# Patient Record
Sex: MALE | Race: OTHER | NOT HISPANIC OR LATINO
[De-identification: names, ages, dates, MRNs, and addresses within clinical notes are randomized per-mention and may not be internally consistent; named-entity substitution may affect disease eponyms.]

---

## 2021-10-29 PROBLEM — Z00.00 ENCOUNTER FOR PREVENTIVE HEALTH EXAMINATION: Status: ACTIVE | Noted: 2021-10-29

## 2021-11-05 ENCOUNTER — NON-APPOINTMENT (OUTPATIENT)
Age: 31
End: 2021-11-05

## 2021-11-05 ENCOUNTER — APPOINTMENT (OUTPATIENT)
Dept: UROLOGY | Facility: CLINIC | Age: 31
End: 2021-11-05
Payer: COMMERCIAL

## 2021-11-05 VITALS
HEIGHT: 69 IN | HEART RATE: 86 BPM | BODY MASS INDEX: 22.22 KG/M2 | TEMPERATURE: 97 F | SYSTOLIC BLOOD PRESSURE: 145 MMHG | WEIGHT: 150 LBS | DIASTOLIC BLOOD PRESSURE: 91 MMHG

## 2021-11-05 PROCEDURE — 99204 OFFICE O/P NEW MOD 45 MIN: CPT

## 2021-11-05 NOTE — PHYSICAL EXAM
[Urethral Meatus] : meatus normal [Penis Abnormality] : normal circumcised penis [Urinary Bladder Findings] : the bladder was normal on palpation [Scrotum] : the scrotum was normal [FreeTextEntry1] : 10cc softer testes. difficult exam. +hydroceles. High tight scrotum mildly hypoplastic. normal phallus. well virilized.

## 2021-11-05 NOTE — ASSESSMENT
[FreeTextEntry1] : hypogonadism\par hypopituitarianism\par hx chemo in childhood\par azoospermia\par on testosterone 3 packets daily\par reviwed with patient \par options - dc TRT start HCG vs cont TRT and add HCG and then downtitrate TRT\par role FSH reviewed\par will renew TRT\par start HCG 3000 IU 3/week\par TT FSH LH E2 CBC CMP today\par repeat labs 4 weeks\par decrease to 2 packets afterward\par f/u 6 weeks

## 2021-11-08 LAB
ALBUMIN SERPL ELPH-MCNC: 5.1 G/DL
ALP BLD-CCNC: 97 U/L
ALT SERPL-CCNC: 63 U/L
ANION GAP SERPL CALC-SCNC: 17 MMOL/L
AST SERPL-CCNC: 28 U/L
BASOPHILS # BLD AUTO: 0.04 K/UL
BASOPHILS NFR BLD AUTO: 0.7 %
BILIRUB SERPL-MCNC: 0.6 MG/DL
BUN SERPL-MCNC: 32 MG/DL
CALCIUM SERPL-MCNC: 9.9 MG/DL
CHLORIDE SERPL-SCNC: 98 MMOL/L
CO2 SERPL-SCNC: 25 MMOL/L
CREAT SERPL-MCNC: 1.37 MG/DL
EOSINOPHIL # BLD AUTO: 0.09 K/UL
EOSINOPHIL NFR BLD AUTO: 1.5 %
ESTRADIOL SERPL-MCNC: 14 PG/ML
FSH SERPL-MCNC: 3.1 IU/L
GLUCOSE SERPL-MCNC: 99 MG/DL
HCT VFR BLD CALC: 46.5 %
HGB BLD-MCNC: 16 G/DL
IMM GRANULOCYTES NFR BLD AUTO: 0.3 %
LH SERPL-ACNC: 1.6 IU/L
LYMPHOCYTES # BLD AUTO: 1.69 K/UL
LYMPHOCYTES NFR BLD AUTO: 29 %
MAN DIFF?: NORMAL
MCHC RBC-ENTMCNC: 30.2 PG
MCHC RBC-ENTMCNC: 34.4 GM/DL
MCV RBC AUTO: 87.7 FL
MONOCYTES # BLD AUTO: 0.57 K/UL
MONOCYTES NFR BLD AUTO: 9.8 %
NEUTROPHILS # BLD AUTO: 3.41 K/UL
NEUTROPHILS NFR BLD AUTO: 58.7 %
PLATELET # BLD AUTO: 327 K/UL
POTASSIUM SERPL-SCNC: 4 MMOL/L
PROT SERPL-MCNC: 7.9 G/DL
RBC # BLD: 5.3 M/UL
RBC # FLD: 13 %
SODIUM SERPL-SCNC: 141 MMOL/L
WBC # FLD AUTO: 5.82 K/UL

## 2021-11-08 RX ORDER — TESTOSTERONE 50 MG/5G
50 MG/5GM GEL TRANSDERMAL
Qty: 300 | Refills: 0 | Status: ACTIVE | COMMUNITY
Start: 2021-11-05 | End: 1900-01-01

## 2021-12-28 ENCOUNTER — APPOINTMENT (OUTPATIENT)
Dept: UROLOGY | Facility: CLINIC | Age: 31
End: 2021-12-28

## 2022-01-18 LAB
TESTOST BND SERPL-MCNC: 5.4 PG/ML
TESTOSTERONE TOTAL S: 135 NG/DL

## 2022-04-29 ENCOUNTER — APPOINTMENT (OUTPATIENT)
Dept: UROLOGY | Facility: CLINIC | Age: 32
End: 2022-04-29

## 2022-06-08 ENCOUNTER — RX RENEWAL (OUTPATIENT)
Age: 32
End: 2022-06-08

## 2022-06-10 ENCOUNTER — NON-APPOINTMENT (OUTPATIENT)
Age: 32
End: 2022-06-10

## 2022-06-10 LAB
ALBUMIN SERPL ELPH-MCNC: 4.8 G/DL
ALP BLD-CCNC: 103 U/L
ALT SERPL-CCNC: 28 U/L
ANION GAP SERPL CALC-SCNC: 21 MMOL/L
AST SERPL-CCNC: 20 U/L
BASOPHILS # BLD AUTO: 0.03 K/UL
BASOPHILS NFR BLD AUTO: 0.4 %
BILIRUB SERPL-MCNC: 0.3 MG/DL
BUN SERPL-MCNC: 19 MG/DL
CALCIUM SERPL-MCNC: 9.5 MG/DL
CHLORIDE SERPL-SCNC: 93 MMOL/L
CO2 SERPL-SCNC: 25 MMOL/L
CREAT SERPL-MCNC: 1.34 MG/DL
EGFR: 73 ML/MIN/1.73M2
EOSINOPHIL # BLD AUTO: 0.11 K/UL
EOSINOPHIL NFR BLD AUTO: 1.3 %
ESTRADIOL SERPL-MCNC: 65 PG/ML
FSH SERPL-MCNC: <0.3 IU/L
GLUCOSE SERPL-MCNC: 48 MG/DL
HCT VFR BLD CALC: 46.6 %
HGB BLD-MCNC: 16.1 G/DL
IMM GRANULOCYTES NFR BLD AUTO: 0.2 %
LH SERPL-ACNC: <0.3 IU/L
LYMPHOCYTES # BLD AUTO: 1.87 K/UL
LYMPHOCYTES NFR BLD AUTO: 22.6 %
MAN DIFF?: NORMAL
MCHC RBC-ENTMCNC: 29 PG
MCHC RBC-ENTMCNC: 34.5 GM/DL
MCV RBC AUTO: 84 FL
MONOCYTES # BLD AUTO: 0.74 K/UL
MONOCYTES NFR BLD AUTO: 8.9 %
NEUTROPHILS # BLD AUTO: 5.51 K/UL
NEUTROPHILS NFR BLD AUTO: 66.6 %
PLATELET # BLD AUTO: 327 K/UL
POTASSIUM SERPL-SCNC: 2.8 MMOL/L
PROT SERPL-MCNC: 7.4 G/DL
RBC # BLD: 5.55 M/UL
RBC # FLD: 13.6 %
SODIUM SERPL-SCNC: 139 MMOL/L
TESTOST SERPL-MCNC: 824 NG/DL
WBC # FLD AUTO: 8.28 K/UL

## 2022-06-16 ENCOUNTER — APPOINTMENT (OUTPATIENT)
Dept: UROLOGY | Facility: CLINIC | Age: 32
End: 2022-06-16
Payer: COMMERCIAL

## 2022-06-16 VITALS — SYSTOLIC BLOOD PRESSURE: 131 MMHG | TEMPERATURE: 98.5 F | HEART RATE: 93 BPM | DIASTOLIC BLOOD PRESSURE: 90 MMHG

## 2022-06-16 PROCEDURE — 99214 OFFICE O/P EST MOD 30 MIN: CPT

## 2022-06-16 RX ORDER — CHORIONIC GONADOTROPIN 10000 UNIT
10000 KIT INTRAMUSCULAR
Qty: 1 | Refills: 0 | Status: DISCONTINUED | COMMUNITY
Start: 2021-11-05 | End: 2022-06-16

## 2022-06-16 RX ORDER — NEEDLES, DISPOSABLE 25GX5/8"
21G X 1-1/2" NEEDLE, DISPOSABLE MISCELLANEOUS
Qty: 15 | Refills: 3 | Status: DISCONTINUED | COMMUNITY
Start: 2021-11-05 | End: 2022-06-16

## 2022-06-19 NOTE — ASSESSMENT
[FreeTextEntry1] : Hypogonadism\par Hypopituitarism\par s/p chemo in childhood\par Continue Testim, decrease to 2 packets daily \par Ovidrel 250 mcg weekly \par  - Kellen 10, 2022\par HCt 46.6%\par previous Azoospermia 2021\par Repeat SA \par will move towards dsiconitnuing TRT\par f/u in 3 months

## 2022-06-19 NOTE — HISTORY OF PRESENT ILLNESS
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 using 3 packets of testim daily\par Started Ovidrel February 2022, 250 mcg weekly \par  - Kellen 10, 2022\par HCt 46.6%\par K - 2.8, corrected by PCP \par Feels good energy \par

## 2022-09-27 ENCOUNTER — RX RENEWAL (OUTPATIENT)
Age: 32
End: 2022-09-27

## 2022-09-28 ENCOUNTER — RX RENEWAL (OUTPATIENT)
Age: 32
End: 2022-09-28

## 2022-10-24 ENCOUNTER — LABORATORY RESULT (OUTPATIENT)
Age: 32
End: 2022-10-24

## 2022-10-24 ENCOUNTER — APPOINTMENT (OUTPATIENT)
Dept: FAMILY MEDICINE | Facility: CLINIC | Age: 32
End: 2022-10-24

## 2022-10-24 VITALS
RESPIRATION RATE: 15 BRPM | TEMPERATURE: 98 F | HEART RATE: 110 BPM | WEIGHT: 143 LBS | HEIGHT: 69 IN | OXYGEN SATURATION: 98 % | BODY MASS INDEX: 21.18 KG/M2 | DIASTOLIC BLOOD PRESSURE: 74 MMHG | SYSTOLIC BLOOD PRESSURE: 125 MMHG

## 2022-10-24 DIAGNOSIS — Z86.39 PERSONAL HISTORY OF OTHER ENDOCRINE, NUTRITIONAL AND METABOLIC DISEASE: ICD-10-CM

## 2022-10-24 DIAGNOSIS — R50.9 FEVER, UNSPECIFIED: ICD-10-CM

## 2022-10-24 DIAGNOSIS — R53.83 OTHER FATIGUE: ICD-10-CM

## 2022-10-24 DIAGNOSIS — F17.290 NICOTINE DEPENDENCE, OTHER TOBACCO PRODUCT, UNCOMPLICATED: ICD-10-CM

## 2022-10-24 DIAGNOSIS — Z82.69 FAMILY HISTORY OF OTHER DISEASES OF THE MUSCULOSKELETAL SYSTEM AND CONNECTIVE TISSUE: ICD-10-CM

## 2022-10-24 DIAGNOSIS — Z78.9 OTHER SPECIFIED HEALTH STATUS: ICD-10-CM

## 2022-10-24 DIAGNOSIS — Z87.19 PERSONAL HISTORY OF OTHER DISEASES OF THE DIGESTIVE SYSTEM: ICD-10-CM

## 2022-10-24 DIAGNOSIS — Z85.841 PERSONAL HISTORY OF MALIGNANT NEOPLASM OF BRAIN: ICD-10-CM

## 2022-10-24 PROCEDURE — 99204 OFFICE O/P NEW MOD 45 MIN: CPT | Mod: 25

## 2022-10-24 PROCEDURE — 36415 COLL VENOUS BLD VENIPUNCTURE: CPT

## 2022-10-24 RX ORDER — SILDENAFIL 20 MG/1
20 TABLET ORAL
Qty: 30 | Refills: 0 | Status: ACTIVE | COMMUNITY
Start: 2022-04-04

## 2022-10-24 NOTE — ASSESSMENT
[FreeTextEntry1] : 32-year-old male with history of GERD and medulloblastoma s/p chemo/radiation treatment and brain resection (2004) and hypopituitarism presents to establish care.

## 2022-10-24 NOTE — PHYSICAL EXAM
[No Acute Distress] : no acute distress [Well Developed] : well developed [Well-Appearing] : well-appearing [Normal Sclera/Conjunctiva] : normal sclera/conjunctiva [EOMI] : extraocular movements intact [Normal Oropharynx] : the oropharynx was normal [Normal TMs] : both tympanic membranes were normal [No Lymphadenopathy] : no lymphadenopathy [Supple] : supple [No Respiratory Distress] : no respiratory distress  [No Accessory Muscle Use] : no accessory muscle use [Clear to Auscultation] : lungs were clear to auscultation bilaterally [Normal Rate] : normal rate  [Regular Rhythm] : with a regular rhythm [Normal S1, S2] : normal S1 and S2 [No Edema] : there was no peripheral edema [Soft] : abdomen soft [Non Tender] : non-tender [Non-distended] : non-distended [No Masses] : no abdominal mass palpated [Normal Bowel Sounds] : normal bowel sounds [No CVA Tenderness] : no CVA  tenderness [No Spinal Tenderness] : no spinal tenderness [Grossly Normal Strength/Tone] : grossly normal strength/tone [No Rash] : no rash [No Focal Deficits] : no focal deficits [Speech Grossly Normal] : speech grossly normal [Normal Affect] : the affect was normal [Normal Mood] : the mood was normal [de-identified] : firm, hard nodule on right ear (outer helix)

## 2022-10-24 NOTE — HISTORY OF PRESENT ILLNESS
[FreeTextEntry8] : 32-year-old male with history of GERD and medulloblastoma s/p chemo/radiation treatment and brain resection (2004) and hypopituitarism presents to establish care.\par Patient reports hiking in the [place] 2 days ago and since returning had 101 F yesterday, chills, myalgia and loss of appetite. Pt reports symptoms improved with taking Tylenol.\par He reports recent worsening of GERD symptoms.\par He denies cough, sore throat, chest pain, SOB, vomiting, abdominal pain, diarrhea, dysuria, urinary frequency or urgency.\par \par Patient also reports intermittent right great toe pain that last occurred about a month ago after drinking alcohol.\par \par Specialists:\par Endocrinologist at [facility]\par Urologist Dr. Lee \par

## 2022-10-24 NOTE — REVIEW OF SYSTEMS
[Fever] : fever [Chills] : chills [Fatigue] : fatigue [Heartburn] : heartburn [Joint Pain] : joint pain [Muscle Pain] : muscle pain [Negative] : Heme/Lymph [Hot Flashes] : no hot flashes [Night Sweats] : no night sweats [Recent Change In Weight] : ~T no recent weight change [Abdominal Pain] : no abdominal pain [Nausea] : no nausea [Constipation] : no constipation [Diarrhea] : no diarrhea [Vomiting] : no vomiting [Melena] : no melena [Joint Stiffness] : no joint stiffness [Muscle Weakness] : no muscle weakness [Back Pain] : no back pain [Joint Swelling] : no joint swelling

## 2022-10-24 NOTE — PLAN
[FreeTextEntry1] : \par \par #Fatigue\par #Fever and chills\par -Likely 2/2 Viral infection\par -Supportive care- rest, fluids\par -Tylenol prn pain/fever\par -COVID19 and RVP swab \par -CMP, CBC, Lipid, A1C, TSH w. rFT4, vitamin D level ordered \par -UA + urine culture\par \par #Right great toe pain\par -Uric acid\par \par #Hx of hypothyroidism\par -TSH w. rFT4 \par \par #Hx of hypopituitarism\par -Management as per Endocrinology\par \par #Hypogonadism\par -Management as per Urology \par \par Blood for labs drawn in office today\par \par Patient to contact office if symptoms worsen

## 2022-10-25 LAB
25(OH)D3 SERPL-MCNC: 25.1 NG/ML
ALBUMIN SERPL ELPH-MCNC: 4.7 G/DL
ALP BLD-CCNC: 106 U/L
ALT SERPL-CCNC: 40 U/L
ANION GAP SERPL CALC-SCNC: 14 MMOL/L
APPEARANCE: CLEAR
AST SERPL-CCNC: 51 U/L
BACTERIA: NEGATIVE
BASOPHILS # BLD AUTO: 0.03 K/UL
BASOPHILS NFR BLD AUTO: 0.4 %
BILIRUB SERPL-MCNC: 0.5 MG/DL
BILIRUBIN URINE: NEGATIVE
BLOOD URINE: ABNORMAL
BUN SERPL-MCNC: 17 MG/DL
CALCIUM SERPL-MCNC: 9 MG/DL
CHLORIDE SERPL-SCNC: 96 MMOL/L
CHOLEST SERPL-MCNC: 199 MG/DL
CO2 SERPL-SCNC: 26 MMOL/L
COLOR: YELLOW
CREAT SERPL-MCNC: 1.29 MG/DL
EGFR: 76 ML/MIN/1.73M2
EOSINOPHIL # BLD AUTO: 0.07 K/UL
EOSINOPHIL NFR BLD AUTO: 0.9 %
ESTIMATED AVERAGE GLUCOSE: 117 MG/DL
ESTRADIOL SERPL-MCNC: 44 PG/ML
GLUCOSE QUALITATIVE U: NEGATIVE
GLUCOSE SERPL-MCNC: 87 MG/DL
HBA1C MFR BLD HPLC: 5.7 %
HCT VFR BLD CALC: 43.9 %
HDLC SERPL-MCNC: 41 MG/DL
HGB BLD-MCNC: 15.1 G/DL
HYALINE CASTS: 1 /LPF
IMM GRANULOCYTES NFR BLD AUTO: 0.5 %
KETONES URINE: NEGATIVE
LDLC SERPL CALC-MCNC: 141 MG/DL
LEUKOCYTE ESTERASE URINE: NEGATIVE
LH SERPL-ACNC: 0.3 IU/L
LYMPHOCYTES # BLD AUTO: 1.42 K/UL
LYMPHOCYTES NFR BLD AUTO: 19 %
MAN DIFF?: NORMAL
MCHC RBC-ENTMCNC: 29.3 PG
MCHC RBC-ENTMCNC: 34.4 GM/DL
MCV RBC AUTO: 85.1 FL
MICROSCOPIC-UA: NORMAL
MONOCYTES # BLD AUTO: 0.83 K/UL
MONOCYTES NFR BLD AUTO: 11.1 %
NEUTROPHILS # BLD AUTO: 5.08 K/UL
NEUTROPHILS NFR BLD AUTO: 68.1 %
NITRITE URINE: NEGATIVE
NONHDLC SERPL-MCNC: 158 MG/DL
PH URINE: 6
PLATELET # BLD AUTO: 370 K/UL
POTASSIUM SERPL-SCNC: 3.8 MMOL/L
PROT SERPL-MCNC: 7.7 G/DL
PROTEIN URINE: ABNORMAL
RAPID RVP RESULT: NOT DETECTED
RBC # BLD: 5.16 M/UL
RBC # FLD: 13.3 %
RED BLOOD CELLS URINE: 2 /HPF
SARS-COV-2 RNA PNL RESP NAA+PROBE: NOT DETECTED
SODIUM SERPL-SCNC: 137 MMOL/L
SPECIFIC GRAVITY URINE: 1.03
SQUAMOUS EPITHELIAL CELLS: 0 /HPF
TESTOST SERPL-MCNC: 385 NG/DL
TRIGL SERPL-MCNC: 86 MG/DL
TSH SERPL-ACNC: <0.01 UIU/ML
URATE SERPL-MCNC: 9.1 MG/DL
UROBILINOGEN URINE: NORMAL
VIT B12 SERPL-MCNC: 474 PG/ML
WBC # FLD AUTO: 7.47 K/UL
WHITE BLOOD CELLS URINE: 1 /HPF

## 2022-10-26 LAB — BACTERIA UR CULT: NORMAL

## 2022-10-28 ENCOUNTER — APPOINTMENT (OUTPATIENT)
Dept: OTOLARYNGOLOGY | Facility: CLINIC | Age: 32
End: 2022-10-28

## 2022-11-02 ENCOUNTER — OUTPATIENT (OUTPATIENT)
Dept: OUTPATIENT SERVICES | Facility: HOSPITAL | Age: 32
LOS: 1 days | End: 2022-11-02

## 2022-11-02 ENCOUNTER — APPOINTMENT (OUTPATIENT)
Dept: MRI IMAGING | Facility: CLINIC | Age: 32
End: 2022-11-02

## 2022-11-02 ENCOUNTER — RX CHANGE (OUTPATIENT)
Age: 32
End: 2022-11-02

## 2022-11-02 ENCOUNTER — TRANSCRIPTION ENCOUNTER (OUTPATIENT)
Age: 32
End: 2022-11-02

## 2022-11-02 LAB — FSH SERPL-MCNC: <0.3 IU/L

## 2022-11-02 PROCEDURE — 70553 MRI BRAIN STEM W/O & W/DYE: CPT | Mod: 26

## 2022-11-11 ENCOUNTER — APPOINTMENT (OUTPATIENT)
Dept: UROLOGY | Facility: CLINIC | Age: 32
End: 2022-11-11

## 2023-01-23 ENCOUNTER — APPOINTMENT (OUTPATIENT)
Dept: ENDOCRINOLOGY | Facility: CLINIC | Age: 33
End: 2023-01-23
Payer: COMMERCIAL

## 2023-01-23 VITALS
DIASTOLIC BLOOD PRESSURE: 86 MMHG | BODY MASS INDEX: 22.3 KG/M2 | WEIGHT: 151 LBS | HEART RATE: 106 BPM | SYSTOLIC BLOOD PRESSURE: 126 MMHG

## 2023-01-23 PROCEDURE — 99205 OFFICE O/P NEW HI 60 MIN: CPT

## 2023-01-23 NOTE — END OF VISIT
[FreeTextEntry3] : All medical record entries made by the Scribe were at my, Dr. Abran Mcdonald, direction and personally dictated by me on 01/23/2023. I have reviewed the chart and agree that the record accurately reflects my personal performance of the history, physical exam, assessment and plan. I have also personally directed, reviewed and agreed with the chart.  [Time Spent: ___ minutes] : I have spent [unfilled] minutes of time on the encounter.

## 2023-01-23 NOTE — HISTORY OF PRESENT ILLNESS
[FreeTextEntry1] : 32 year old M pt, with Hx of Hypothyroidism (dx. in 2004), presents today to establish endocrine care. \par # Hypothyroidism, secondary to brain resection in 2004. (Of note, developed hypogonadism and hypopituitarism shortly after resection):\par Treatment: Been on Synthroid since 2004. \par Denies history of: bone fractures. \par \par Other PMHx: medulloblastoma (dx age 13 s/p chemo/radiation treatment -received cisplatin- and brain resection in 2004 at [facility] complicated by hypopituitarism), Hypogonadism (on testosterone since 2017), GERD, flared gout. \par SHx: Pediatrician. \par Seeing urologist for fertility. \par \par 01/23/2023\par Review of pt's chart:\par - 11/05/21 Urologist note, Dr. Lee: Medulloblastoma s/p chemo/radiation treatment -received cisplatin- and brain resection in 2004 complicated by hypopituitarism. On Synthroid and testosterone treatment (Testin) since 2017. Pt started HCG 3000 IU tiw this day. \par - 06/16/22 Follow up note by Dr. Lee: Ovidrel 250 mcg qw with plan to discontinue testosterone. \par - 10/25/22 MED note: History of flared gout. \par \par Pt is here for hypothyroidism developed soon after treatment for brain tumor age 13, treated with craniospinal radiation and Cisplatin at [facility] in [place]. Developed hypothyroidism soon after treatment. Been on Synthroid since 6064-7895. \par Pt also was diagnosed with hypogonadism and hypopituitarism since surgery. Seen urologist for fertility (has plan for having kids). Currently on Ovidrel. Sperm testing is scheduled by urologist. Recent blood work completed.\par \par Today, pt is feeling well, with no physical complaints. \par Denies dizziness and lightheadedness. \par \par [Medications verified as per pt on 01/23/2023] \par Current Medications: Levothyroxine 125 mcg (rx 2004), testosterone supplementation (rx 2017), Ovidrel 250 mcg qw\par - Held: Growth hormone (Dced a "few years ago" noted 01/23/2023)

## 2023-01-23 NOTE — REASON FOR VISIT
[Initial Evaluation] : an initial evaluation [Hypothyroidism] : hypothyroidism [Pituitary Evaluation/ Disorder] : pituitary evaluation/disorder [Hypogonadism] : hypogonadism

## 2023-01-23 NOTE — ADDENDUM
[FreeTextEntry1] : I Shanikawinston Domínguez act soley as a scribe for Dr. Abran Mcdonald on this date. 01/23/2023

## 2023-01-23 NOTE — PHYSICAL EXAM
[Alert] : alert [Well Nourished] : well nourished [No Acute Distress] : no acute distress [Well Developed] : well developed [Normal Sclera/Conjunctiva] : normal sclera/conjunctiva [EOMI] : extra ocular movement intact [No Proptosis] : no proptosis [Normal Oropharynx] : the oropharynx was normal [Thyroid Not Enlarged] : the thyroid was not enlarged [No Thyroid Nodules] : no palpable thyroid nodules [No Respiratory Distress] : no respiratory distress [No Accessory Muscle Use] : no accessory muscle use [Clear to Auscultation] : lungs were clear to auscultation bilaterally [Normal S1, S2] : normal S1 and S2 [Normal Rate] : heart rate was normal [Regular Rhythm] : with a regular rhythm [No Edema] : no peripheral edema [Pedal Pulses Normal] : the pedal pulses are present [Normal Bowel Sounds] : normal bowel sounds [Not Tender] : non-tender [Not Distended] : not distended [Soft] : abdomen soft [No Spinal Tenderness] : no spinal tenderness [Spine Straight] : spine straight [No Stigmata of Cushings Syndrome] : no stigmata of Cushings Syndrome [Normal Gait] : normal gait [Normal Strength/Tone] : muscle strength and tone were normal [No Rash] : no rash [Normal Reflexes] : deep tendon reflexes were 2+ and symmetric [No Tremors] : no tremors [Oriented x3] : oriented to person, place, and time [Visual Fields Grossly Intact] : visual fields grossly intact [Acanthosis Nigricans] : no acanthosis nigricans [de-identified] : Confrontational visual fields grossly intact.

## 2023-02-07 ENCOUNTER — NON-APPOINTMENT (OUTPATIENT)
Age: 33
End: 2023-02-07

## 2023-02-08 LAB
ACTH-ESO: 28 PG/ML
ALBUMIN SERPL ELPH-MCNC: 4.2 G/DL
ALP BLD-CCNC: 94 U/L
ALT SERPL-CCNC: 33 U/L
ANION GAP SERPL CALC-SCNC: 15 MMOL/L
AST SERPL-CCNC: 22 U/L
BILIRUB SERPL-MCNC: 0.3 MG/DL
BUN SERPL-MCNC: 21 MG/DL
CALCIUM SERPL-MCNC: 9.5 MG/DL
CHLORIDE SERPL-SCNC: 100 MMOL/L
CO2 SERPL-SCNC: 24 MMOL/L
CORTIS SERPL-MCNC: 6.7 UG/DL
CORTIS SERPL-MCNC: 7.5 UG/DL
CREAT SERPL-MCNC: 1.24 MG/DL
EGFR: 79 ML/MIN/1.73M2
GLUCOSE SERPL-MCNC: 104 MG/DL
IGF-1 INTERP: NORMAL
IGF-I BLD-MCNC: 116 NG/ML
POTASSIUM SERPL-SCNC: 3.7 MMOL/L
PROLACTIN SERPL-MCNC: 12.8 NG/ML
PROT SERPL-MCNC: 6.7 G/DL
SHBG-ESOTERIX: 24.2 NMOL/L
SODIUM SERPL-SCNC: 139 MMOL/L
T3 SERPL-MCNC: 173 NG/DL
T4 FREE SERPL-MCNC: 2.1 NG/DL

## 2023-02-22 ENCOUNTER — APPOINTMENT (OUTPATIENT)
Dept: HUMAN REPRODUCTION | Facility: CLINIC | Age: 33
End: 2023-02-22
Payer: COMMERCIAL

## 2023-02-22 PROCEDURE — 89322 SEMEN ANAL STRICT CRITERIA: CPT

## 2023-04-10 ENCOUNTER — APPOINTMENT (OUTPATIENT)
Dept: ENDOCRINOLOGY | Facility: CLINIC | Age: 33
End: 2023-04-10

## 2023-05-22 ENCOUNTER — APPOINTMENT (OUTPATIENT)
Dept: ENDOCRINOLOGY | Facility: CLINIC | Age: 33
End: 2023-05-22
Payer: COMMERCIAL

## 2023-05-22 VITALS
BODY MASS INDEX: 21.92 KG/M2 | DIASTOLIC BLOOD PRESSURE: 77 MMHG | HEIGHT: 69 IN | WEIGHT: 148 LBS | SYSTOLIC BLOOD PRESSURE: 119 MMHG | HEART RATE: 89 BPM

## 2023-05-22 PROCEDURE — 99215 OFFICE O/P EST HI 40 MIN: CPT

## 2023-05-24 ENCOUNTER — APPOINTMENT (OUTPATIENT)
Dept: ENDOCRINOLOGY | Facility: CLINIC | Age: 33
End: 2023-05-24
Payer: COMMERCIAL

## 2023-05-24 VITALS
WEIGHT: 148 LBS | BODY MASS INDEX: 21.86 KG/M2 | HEART RATE: 77 BPM | SYSTOLIC BLOOD PRESSURE: 116 MMHG | DIASTOLIC BLOOD PRESSURE: 69 MMHG

## 2023-05-24 PROCEDURE — 96372 THER/PROPH/DIAG INJ SC/IM: CPT

## 2023-05-24 RX ORDER — COSYNTROPIN 0.25 MG/ML
0.25 INJECTION, POWDER, LYOPHILIZED, FOR SOLUTION INTRAVENOUS
Qty: 0 | Refills: 0 | Status: COMPLETED | OUTPATIENT
Start: 2023-05-24

## 2023-05-24 RX ADMIN — COSYNTROPIN MG: 0.25 INJECTION, POWDER, FOR SOLUTION INTRAMUSCULAR; INTRAVENOUS at 00:00

## 2023-05-25 ENCOUNTER — TRANSCRIPTION ENCOUNTER (OUTPATIENT)
Age: 33
End: 2023-05-25

## 2023-05-25 LAB
ACTH SER-ACNC: 23.4 PG/ML
ACTH STIM ACTH BASELINE: 25.6 PG/ML
ACTH STIM CORTISOL 30 MIN: 22.1 UG/DL
ACTH STIM CORTISOL BASELINE: 7.5 UG/DL
ACTH STIMULATION : CORTISOL 60 MIN: 24.8 UG/DL
ALBUMIN SERPL ELPH-MCNC: 4.7 G/DL
ALP BLD-CCNC: 93 U/L
ALT SERPL-CCNC: 60 U/L
ANION GAP SERPL CALC-SCNC: 15 MMOL/L
AST SERPL-CCNC: 32 U/L
BILIRUB SERPL-MCNC: 0.5 MG/DL
BUN SERPL-MCNC: 25 MG/DL
CALCIUM SERPL-MCNC: 9.2 MG/DL
CHLORIDE SERPL-SCNC: 98 MMOL/L
CO2 SERPL-SCNC: 26 MMOL/L
CORTIS SERPL-MCNC: 5.9 UG/DL
CREAT SERPL-MCNC: 1.39 MG/DL
EGFR: 69 ML/MIN/1.73M2
HCT VFR BLD CALC: 45.3 %
HGB BLD-MCNC: 15.5 G/DL
MCHC RBC-ENTMCNC: 29.3 PG
MCHC RBC-ENTMCNC: 34.2 GM/DL
MCV RBC AUTO: 85.6 FL
PLATELET # BLD AUTO: 310 K/UL
POTASSIUM SERPL-SCNC: 4.8 MMOL/L
PROT SERPL-MCNC: 7.4 G/DL
RBC # BLD: 5.29 M/UL
RBC # FLD: 14.6 %
SODIUM SERPL-SCNC: 140 MMOL/L
T4 FREE SERPL-MCNC: 1.5 NG/DL
TSH SERPL-ACNC: 0.04 UIU/ML
WBC # FLD AUTO: 6.49 K/UL

## 2023-05-25 NOTE — ASSESSMENT
[FreeTextEntry1] : 1. Hypopituitarism\par Patient of Dr Mcdonald presents for ACTH Stim testing\par Hospitalist at [facility]\par Recent AM cortisol 5.9\par Baseline labs obtained\par Following baseline labs, 0.25mg of Cosyntropin (NDC 5331-2330-58, LOT: [id number], EXP: 11/2023) administered IM to left deltoid by myself at 1004A.\par 30 minute cortisol to be obtained at 1034A\par 60 minute cortisol to be obtained at 1104A\par \par Will FU with Dr Mcdonald\par \par Paula Bowens, MS, FNP-BC, [facility][note 3]\par 05/24/2023\par \par

## 2023-05-25 NOTE — REVIEW OF SYSTEMS
[Negative] : Heme/Lymph [Chest Pain] : no chest pain [Nausea] : no nausea [Abdominal Pain] : no abdominal pain [Polyuria] : no polyuria [Muscle Weakness] : no muscle weakness [Muscle Cramps] : no muscle cramps [Headaches] : no headaches [Dizziness] : no dizziness [Fainting] : no fainting [Polydipsia] : no polydipsia [Easy Bruising] : no tendency for easy bruising

## 2023-05-25 NOTE — PHYSICAL EXAM
[Alert] : alert [Well Nourished] : well nourished [No Acute Distress] : no acute distress [Well Developed] : well developed [Normal Sclera/Conjunctiva] : normal sclera/conjunctiva [EOMI] : extra ocular movement intact [Visual Fields Grossly Intact] : visual fields grossly intact [No Proptosis] : no proptosis [Normal Oropharynx] : the oropharynx was normal [Thyroid Not Enlarged] : the thyroid was not enlarged [No Thyroid Nodules] : no palpable thyroid nodules [No Respiratory Distress] : no respiratory distress [No Accessory Muscle Use] : no accessory muscle use [Clear to Auscultation] : lungs were clear to auscultation bilaterally [Normal S1, S2] : normal S1 and S2 [Normal Rate] : heart rate was normal [Regular Rhythm] : with a regular rhythm [No Edema] : no peripheral edema [Pedal Pulses Normal] : the pedal pulses are present [Normal Bowel Sounds] : normal bowel sounds [Not Tender] : non-tender [Not Distended] : not distended [Soft] : abdomen soft [No Spinal Tenderness] : no spinal tenderness [Spine Straight] : spine straight [No Stigmata of Cushings Syndrome] : no stigmata of Cushings Syndrome [Normal Gait] : normal gait [Normal Strength/Tone] : muscle strength and tone were normal [No Rash] : no rash [Normal Reflexes] : deep tendon reflexes were 2+ and symmetric [No Tremors] : no tremors [Oriented x3] : oriented to person, place, and time [Acanthosis Nigricans] : no acanthosis nigricans [de-identified] : Confrontational visual fields grossly intact.

## 2023-05-25 NOTE — END OF VISIT
[Time Spent: ___ minutes] : I have spent [unfilled] minutes of time on the encounter. [FreeTextEntry3] : I, Ras Finnegan, acted solely as a scribe for Dr. Abran Mcdonald on this date 05/22/2023\par

## 2023-05-25 NOTE — ADDENDUM
[FreeTextEntry1] : 5/25/23, addendum, SG\par [facility] message sent with lab interpretation

## 2023-05-25 NOTE — ADDENDUM
[FreeTextEntry1] : 5/25/23, addendum, SG\par [facility] message sent with lab interpretation

## 2023-05-25 NOTE — ADDENDUM
[FreeTextEntry1] : All medical record entries made by the Scribe were at my, Dr. Abran Mcdonald, direction and personally dictated by me on 05/22/2023. I have reviewed the chart and agree that the record accurately reflects my personal performance of the history, physical exam, assessment and plan. I have also personally, directed, reviewed and agreed with the chart\par

## 2023-05-25 NOTE — ASSESSMENT
[FreeTextEntry1] : 1. Hypopituitarism\par Patient of Dr Mcdonald presents for ACTH Stim testing\par Hospitalist at [facility]\par Recent AM cortisol 5.9\par Baseline labs obtained\par Following baseline labs, 0.25mg of Cosyntropin (NDC 4174-4887-80, LOT: [id number], EXP: 11/2023) administered IM to left deltoid by myself at 1004A.\par 30 minute cortisol to be obtained at 1034A\par 60 minute cortisol to be obtained at 1104A\par \par Will FU with Dr Mcdonald\par \par Paula Bowens, MS, FNP-BC, [facility]\par 05/24/2023\par \par

## 2023-05-25 NOTE — REASON FOR VISIT
[Hypothyroidism] : hypothyroidism [Pituitary Evaluation/ Disorder] : pituitary evaluation/disorder [Hypogonadism] : hypogonadism [Follow - Up] : a follow-up visit [Other___] : [unfilled]

## 2023-05-25 NOTE — HISTORY OF PRESENT ILLNESS
[FreeTextEntry1] : 32 year old M pt, with Hx of Hypothyroidism (dx. in 2004), presents today to establish endocrine care. \par # Hypothyroidism, secondary to brain resection in 2004. (Of note, developed hypogonadism and hypopituitarism shortly after resection):\par Treatment: Been on Synthroid since 2004. \par Denies history of: bone fractures. \par \par Other PMHx: medulloblastoma (dx age 13 s/p chemo/radiation treatment -received cisplatin- and brain resection in 2004 at [facility] complicated by hypopituitarism), Hypogonadism (on testosterone since 2017), GERD, flared gout. \par SHx: Pediatrician. \par Seeing urologist for fertility. \par \par 01/23/2023\par Review of pt's chart:\par - 11/05/21 Urologist note, Dr. Lee: Medulloblastoma s/p chemo/radiation treatment -received cisplatin- and brain resection in 2004 complicated by hypopituitarism. On Synthroid and testosterone treatment (Testin) since 2017. Pt started HCG 3000 IU tiw this day. \par - 06/16/22 Follow up note by Dr. Lee: Ovidrel 250 mcg qw with plan to discontinue testosterone. \par - 10/25/22 MED note: History of flared gout. \par \par Pt is here for hypothyroidism developed soon after treatment for brain tumor age 13, treated with craniospinal radiation and Cisplatin at [facility] in [place]. Developed hypothyroidism soon after treatment. Been on Synthroid since 2793-7726. \par Pt also was diagnosed with hypogonadism and hypopituitarism since surgery. Seen urologist for fertility (has plan for having kids). Currently on Ovidrel. Sperm testing is scheduled by urologist. Recent blood work completed.\par \par Today, pt is feeling well, with no physical complaints. \par Denies dizziness and lightheadedness. \par \par 05/22/2023 \par CC: "I am doing well" , He is physically active, his level of energy is adequate, although his libido is decreased. patient reports missed HCG injection two weeks ago, serum am cortisol has been low twice.\par Patient denies lightheadedness, fainting episodes, weight lost.\par Last visit free T4 was increased.\par \par \par Current Medications: Levothyroxine 112 mcg , testosterone supplementation (rx 2017), Ovidrel 250 mcg qw\par - Held: Growth hormone (Dced a "few years ago" noted 01/23/2023)

## 2023-06-02 ENCOUNTER — APPOINTMENT (OUTPATIENT)
Dept: UROLOGY | Facility: CLINIC | Age: 33
End: 2023-06-02
Payer: COMMERCIAL

## 2023-06-02 VITALS — TEMPERATURE: 98 F | HEART RATE: 85 BPM | SYSTOLIC BLOOD PRESSURE: 126 MMHG | DIASTOLIC BLOOD PRESSURE: 78 MMHG

## 2023-06-02 DIAGNOSIS — M79.676 PAIN IN UNSPECIFIED TOE(S): ICD-10-CM

## 2023-06-02 PROCEDURE — 99214 OFFICE O/P EST MOD 30 MIN: CPT

## 2023-06-02 NOTE — ASSESSMENT
[FreeTextEntry1] : hypopituitarisim\par hypogoandism\par TT FSH LH E2 today\par CBC CMP\par consider increase to Ovidrel 250 2/weekly pending results\par reviewed role rFSH\par will hold for now - optimize hormones x few months and then reassess\par f/u 6 months\par

## 2023-06-07 LAB
ALBUMIN SERPL ELPH-MCNC: 4.7 G/DL
ALP BLD-CCNC: 97 U/L
ALT SERPL-CCNC: 51 U/L
ANION GAP SERPL CALC-SCNC: 13 MMOL/L
AST SERPL-CCNC: 27 U/L
BILIRUB SERPL-MCNC: 0.4 MG/DL
BUN SERPL-MCNC: 24 MG/DL
CALCIUM SERPL-MCNC: 9.6 MG/DL
CHLORIDE SERPL-SCNC: 99 MMOL/L
CO2 SERPL-SCNC: 27 MMOL/L
CREAT SERPL-MCNC: 1.36 MG/DL
EGFR: 71 ML/MIN/1.73M2
ESTRADIOL SERPL-MCNC: 23 PG/ML
FSH SERPL-MCNC: 0.4 IU/L
GLUCOSE SERPL-MCNC: 94 MG/DL
LH SERPL-ACNC: <0.3 IU/L
POTASSIUM SERPL-SCNC: 4.5 MMOL/L
PROT SERPL-MCNC: 7.1 G/DL
SODIUM SERPL-SCNC: 139 MMOL/L
TESTOST FREE SERPL-MCNC: 0.2 PG/ML
TESTOST SERPL-MCNC: 282 NG/DL

## 2023-06-08 ENCOUNTER — TRANSCRIPTION ENCOUNTER (OUTPATIENT)
Age: 33
End: 2023-06-08

## 2023-08-11 ENCOUNTER — APPOINTMENT (OUTPATIENT)
Dept: DERMATOLOGY | Facility: CLINIC | Age: 33
End: 2023-08-11
Payer: COMMERCIAL

## 2023-08-11 ENCOUNTER — LABORATORY RESULT (OUTPATIENT)
Age: 33
End: 2023-08-11

## 2023-08-11 DIAGNOSIS — L98.9 DISORDER OF THE SKIN AND SUBCUTANEOUS TISSUE, UNSPECIFIED: ICD-10-CM

## 2023-08-11 PROCEDURE — 99213 OFFICE O/P EST LOW 20 MIN: CPT

## 2023-08-11 PROCEDURE — 99203 OFFICE O/P NEW LOW 30 MIN: CPT

## 2023-08-14 ENCOUNTER — NON-APPOINTMENT (OUTPATIENT)
Age: 33
End: 2023-08-14

## 2023-08-16 ENCOUNTER — RX RENEWAL (OUTPATIENT)
Age: 33
End: 2023-08-16

## 2023-10-16 ENCOUNTER — TRANSCRIPTION ENCOUNTER (OUTPATIENT)
Age: 33
End: 2023-10-16

## 2023-10-16 ENCOUNTER — NON-APPOINTMENT (OUTPATIENT)
Age: 33
End: 2023-10-16

## 2023-10-18 ENCOUNTER — TRANSCRIPTION ENCOUNTER (OUTPATIENT)
Age: 33
End: 2023-10-18

## 2023-10-18 LAB
ALBUMIN SERPL ELPH-MCNC: 4.6 G/DL
ALP BLD-CCNC: 100 U/L
ALT SERPL-CCNC: 30 U/L
ANION GAP SERPL CALC-SCNC: 12 MMOL/L
AST SERPL-CCNC: 15 U/L
BASOPHILS # BLD AUTO: 0.04 K/UL
BASOPHILS NFR BLD AUTO: 0.6 %
BILIRUB SERPL-MCNC: 0.2 MG/DL
BUN SERPL-MCNC: 29 MG/DL
CALCIUM SERPL-MCNC: 9.4 MG/DL
CHLORIDE SERPL-SCNC: 100 MMOL/L
CO2 SERPL-SCNC: 26 MMOL/L
CREAT SERPL-MCNC: 1.4 MG/DL
EGFR: 68 ML/MIN/1.73M2
EOSINOPHIL # BLD AUTO: 0.15 K/UL
EOSINOPHIL NFR BLD AUTO: 2.4 %
ESTRADIOL SERPL-MCNC: 29 PG/ML
GLUCOSE SERPL-MCNC: 117 MG/DL
HCT VFR BLD CALC: 40.7 %
HGB BLD-MCNC: 13.8 G/DL
IMM GRANULOCYTES NFR BLD AUTO: 0.3 %
LYMPHOCYTES # BLD AUTO: 1.96 K/UL
LYMPHOCYTES NFR BLD AUTO: 30.9 %
MAN DIFF?: NORMAL
MCHC RBC-ENTMCNC: 29.5 PG
MCHC RBC-ENTMCNC: 33.9 GM/DL
MCV RBC AUTO: 87 FL
MONOCYTES # BLD AUTO: 0.62 K/UL
MONOCYTES NFR BLD AUTO: 9.8 %
NEUTROPHILS # BLD AUTO: 3.56 K/UL
NEUTROPHILS NFR BLD AUTO: 56 %
PLATELET # BLD AUTO: 302 K/UL
POTASSIUM SERPL-SCNC: 3.6 MMOL/L
PROT SERPL-MCNC: 6.9 G/DL
RBC # BLD: 4.68 M/UL
RBC # FLD: 13.4 %
SODIUM SERPL-SCNC: 138 MMOL/L
TESTOST FREE SERPL-MCNC: 0.7 PG/ML
TESTOST SERPL-MCNC: 237 NG/DL
WBC # FLD AUTO: 6.35 K/UL

## 2023-10-23 ENCOUNTER — TRANSCRIPTION ENCOUNTER (OUTPATIENT)
Age: 33
End: 2023-10-23

## 2023-11-07 ENCOUNTER — APPOINTMENT (OUTPATIENT)
Dept: ENDOCRINOLOGY | Facility: CLINIC | Age: 33
End: 2023-11-07
Payer: COMMERCIAL

## 2023-11-07 VITALS
HEIGHT: 69 IN | BODY MASS INDEX: 22.22 KG/M2 | HEART RATE: 92 BPM | DIASTOLIC BLOOD PRESSURE: 67 MMHG | SYSTOLIC BLOOD PRESSURE: 103 MMHG | WEIGHT: 150 LBS

## 2023-11-07 PROCEDURE — 99215 OFFICE O/P EST HI 40 MIN: CPT

## 2023-11-08 ENCOUNTER — APPOINTMENT (OUTPATIENT)
Dept: NEUROLOGY | Facility: CLINIC | Age: 33
End: 2023-11-08
Payer: COMMERCIAL

## 2023-11-08 ENCOUNTER — NON-APPOINTMENT (OUTPATIENT)
Age: 33
End: 2023-11-08

## 2023-11-08 VITALS
WEIGHT: 150 LBS | RESPIRATION RATE: 16 BRPM | OXYGEN SATURATION: 97 % | SYSTOLIC BLOOD PRESSURE: 113 MMHG | HEIGHT: 69 IN | DIASTOLIC BLOOD PRESSURE: 77 MMHG | HEART RATE: 93 BPM | BODY MASS INDEX: 22.22 KG/M2

## 2023-11-08 PROCEDURE — 99204 OFFICE O/P NEW MOD 45 MIN: CPT

## 2023-12-12 ENCOUNTER — APPOINTMENT (OUTPATIENT)
Dept: MRI IMAGING | Facility: CLINIC | Age: 33
End: 2023-12-12

## 2024-01-04 RX ORDER — CHORIOGONADOTROPIN ALFA 250 UG/.5ML
250 INJECTION, SOLUTION SUBCUTANEOUS
Qty: 6 | Refills: 1 | Status: ACTIVE | COMMUNITY
Start: 2022-01-14 | End: 1900-01-01

## 2024-01-07 ENCOUNTER — NON-APPOINTMENT (OUTPATIENT)
Age: 34
End: 2024-01-07

## 2024-01-08 ENCOUNTER — NON-APPOINTMENT (OUTPATIENT)
Age: 34
End: 2024-01-08

## 2024-01-08 ENCOUNTER — TRANSCRIPTION ENCOUNTER (OUTPATIENT)
Age: 34
End: 2024-01-08

## 2024-01-08 DIAGNOSIS — E29.1 TESTICULAR HYPOFUNCTION: ICD-10-CM

## 2024-01-08 RX ORDER — GONADOTROPHIN, CHORIONIC 10000 UNIT
10000 KIT INTRAMUSCULAR
Qty: 1 | Refills: 1 | Status: DISCONTINUED | COMMUNITY
Start: 2024-01-08 | End: 2024-01-08

## 2024-01-23 ENCOUNTER — APPOINTMENT (OUTPATIENT)
Dept: UROLOGY | Facility: CLINIC | Age: 34
End: 2024-01-23
Payer: COMMERCIAL

## 2024-01-23 VITALS
BODY MASS INDEX: 22.22 KG/M2 | HEART RATE: 72 BPM | SYSTOLIC BLOOD PRESSURE: 114 MMHG | HEIGHT: 69 IN | WEIGHT: 150 LBS | DIASTOLIC BLOOD PRESSURE: 65 MMHG | TEMPERATURE: 97.5 F

## 2024-01-23 DIAGNOSIS — E23.0 HYPOPITUITARISM: ICD-10-CM

## 2024-01-23 LAB
ESTRADIOL SERPL-MCNC: 45 PG/ML
FSH SERPL-MCNC: <0.3 IU/L
LH SERPL-ACNC: <0.3 IU/L
TESTOST SERPL-MCNC: 420 NG/DL

## 2024-01-23 PROCEDURE — 99214 OFFICE O/P EST MOD 30 MIN: CPT

## 2024-01-23 RX ORDER — FOLLITROPIN 300 [IU]/.5ML
300 INJECTION, SOLUTION SUBCUTANEOUS
Qty: 2 | Refills: 0 | Status: ACTIVE | COMMUNITY
Start: 2024-01-23 | End: 1900-01-01

## 2024-01-23 NOTE — ASSESSMENT
[FreeTextEntry1] : hypopituitarism  hypogonadism strong interest in child bearing c/w Pregnyl responding well to HCG given hypopit will require FSH for any attmept at sperm production will start gonal F 75 IU 2/weekly labs 3 months including FSH for SA with cryo at that time f/u after

## 2024-01-23 NOTE — HISTORY OF PRESENT ILLNESS
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 previous 2 packets of testim daily (no longer on Testosterone)  last seen 06/2023 previously on Ovidrel  gap x1 week currently on Pregnyl (just started ~1 week ago)  2000 units / 3 week no issues so far reports good libido on medications  Labs 1/22/2024 E2 45  LH < 0.3 FSH <0.3  SA 3/2023 azoospermia

## 2024-01-25 ENCOUNTER — NON-APPOINTMENT (OUTPATIENT)
Age: 34
End: 2024-01-25

## 2024-01-26 ENCOUNTER — APPOINTMENT (OUTPATIENT)
Dept: MRI IMAGING | Facility: HOSPITAL | Age: 34
End: 2024-01-26

## 2024-01-26 ENCOUNTER — OUTPATIENT (OUTPATIENT)
Dept: OUTPATIENT SERVICES | Facility: HOSPITAL | Age: 34
LOS: 1 days | End: 2024-01-26
Payer: COMMERCIAL

## 2024-01-26 ENCOUNTER — APPOINTMENT (OUTPATIENT)
Dept: RADIOLOGY | Facility: HOSPITAL | Age: 34
End: 2024-01-26

## 2024-01-26 PROCEDURE — 70553 MRI BRAIN STEM W/O & W/DYE: CPT

## 2024-01-26 PROCEDURE — 77080 DXA BONE DENSITY AXIAL: CPT

## 2024-01-26 PROCEDURE — A9585: CPT

## 2024-01-26 PROCEDURE — 77080 DXA BONE DENSITY AXIAL: CPT | Mod: 26

## 2024-01-26 PROCEDURE — 70553 MRI BRAIN STEM W/O & W/DYE: CPT | Mod: 26

## 2024-01-31 ENCOUNTER — NON-APPOINTMENT (OUTPATIENT)
Age: 34
End: 2024-01-31

## 2024-02-02 DIAGNOSIS — R60.9 EDEMA, UNSPECIFIED: ICD-10-CM

## 2024-02-02 DIAGNOSIS — E23.0 HYPOPITUITARISM: ICD-10-CM

## 2024-02-07 ENCOUNTER — TRANSCRIPTION ENCOUNTER (OUTPATIENT)
Age: 34
End: 2024-02-07

## 2024-02-07 ENCOUNTER — NON-APPOINTMENT (OUTPATIENT)
Age: 34
End: 2024-02-07

## 2024-02-08 ENCOUNTER — TRANSCRIPTION ENCOUNTER (OUTPATIENT)
Age: 34
End: 2024-02-08

## 2024-02-08 ENCOUNTER — NON-APPOINTMENT (OUTPATIENT)
Age: 34
End: 2024-02-08

## 2024-02-23 ENCOUNTER — NON-APPOINTMENT (OUTPATIENT)
Age: 34
End: 2024-02-23

## 2024-03-05 ENCOUNTER — NON-APPOINTMENT (OUTPATIENT)
Age: 34
End: 2024-03-05

## 2024-03-05 RX ORDER — LEVOTHYROXINE SODIUM 0.11 MG/1
112 TABLET ORAL
Qty: 90 | Refills: 2 | Status: ACTIVE | COMMUNITY
Start: 2022-10-24 | End: 1900-01-01

## 2024-03-12 ENCOUNTER — APPOINTMENT (OUTPATIENT)
Dept: NEUROLOGY | Facility: CLINIC | Age: 34
End: 2024-03-12
Payer: COMMERCIAL

## 2024-03-12 DIAGNOSIS — D32.9 BENIGN NEOPLASM OF MENINGES, UNSPECIFIED: ICD-10-CM

## 2024-03-12 PROCEDURE — 99213 OFFICE O/P EST LOW 20 MIN: CPT | Mod: 95

## 2024-03-14 PROBLEM — D32.9 MENINGIOMA: Status: ACTIVE | Noted: 2023-10-16

## 2024-03-14 NOTE — REASON FOR VISIT
[Home] : at home, [unfilled] , at the time of the visit. [Medical Office: ([facility])___] : at the medical office located in  [Patient] : the patient [Self] : self

## 2024-03-14 NOTE — PHYSICAL EXAM
[FreeTextEntry1] : Limited by TEB Well appearing in NAD Alert and oriented, able to provide history, not aphasic, no cognitive difficulties, memory intact CN intact EOM, face symmetry, tongue midline Motor moves all four extremities apparently with full power Gait ambulates normally, station normal

## 2024-03-14 NOTE — DATA REVIEWED
[de-identified] : MRI IAC from 11/22 reviewed personally showing small falcine lesion, minimally increased on 1/24 MRI

## 2024-03-25 RX ORDER — SYRINGE W-NEEDLE,DISPOSAB,3 ML 23GX1"
23G X 1" SYRINGE, EMPTY DISPOSABLE MISCELLANEOUS
Qty: 10 | Refills: 3 | Status: ACTIVE | COMMUNITY
Start: 2024-03-25 | End: 1900-01-01

## 2024-03-25 RX ORDER — NEEDLES, SAFETY 18GX1 1/2"
27G X 1/2" NEEDLE, DISPOSABLE MISCELLANEOUS
Qty: 10 | Refills: 3 | Status: ACTIVE | COMMUNITY
Start: 2024-03-25 | End: 1900-01-01

## 2024-04-23 ENCOUNTER — RX RENEWAL (OUTPATIENT)
Age: 34
End: 2024-04-23

## 2024-04-23 ENCOUNTER — APPOINTMENT (OUTPATIENT)
Dept: UROLOGY | Facility: CLINIC | Age: 34
End: 2024-04-23

## 2024-05-01 ENCOUNTER — APPOINTMENT (OUTPATIENT)
Dept: FAMILY MEDICINE | Facility: CLINIC | Age: 34
End: 2024-05-01
Payer: COMMERCIAL

## 2024-05-01 VITALS
RESPIRATION RATE: 16 BRPM | BODY MASS INDEX: 22.22 KG/M2 | HEIGHT: 69 IN | OXYGEN SATURATION: 95 % | SYSTOLIC BLOOD PRESSURE: 124 MMHG | HEART RATE: 99 BPM | DIASTOLIC BLOOD PRESSURE: 86 MMHG | WEIGHT: 150 LBS | TEMPERATURE: 97 F

## 2024-05-01 DIAGNOSIS — J02.9 ACUTE PHARYNGITIS, UNSPECIFIED: ICD-10-CM

## 2024-05-01 DIAGNOSIS — J06.9 ACUTE UPPER RESPIRATORY INFECTION, UNSPECIFIED: ICD-10-CM

## 2024-05-01 DIAGNOSIS — Z11.3 ENCOUNTER FOR SCREENING FOR INFECTIONS WITH A PREDOMINANTLY SEXUAL MODE OF TRANSMISSION: ICD-10-CM

## 2024-05-01 LAB — S PYO AG SPEC QL IA: NEGATIVE

## 2024-05-01 PROCEDURE — 87880 STREP A ASSAY W/OPTIC: CPT | Mod: QW

## 2024-05-01 PROCEDURE — 36415 COLL VENOUS BLD VENIPUNCTURE: CPT

## 2024-05-01 PROCEDURE — 99213 OFFICE O/P EST LOW 20 MIN: CPT

## 2024-05-01 RX ORDER — BENZONATATE 100 MG/1
100 CAPSULE ORAL
Qty: 21 | Refills: 0 | Status: ACTIVE | COMMUNITY
Start: 2024-05-01 | End: 1900-01-01

## 2024-05-01 NOTE — HISTORY OF PRESENT ILLNESS
[FreeTextEntry8] : 32 y/o male presents today c/o productive cough x 2 days.  Pt also c/o congestion and initially sore throat. He denies fever, chest pain, dyspnea or diarrhea.  He will be traveling to visit his family on 05/04 and would like COVID19 testing. Patient works in the NICU.  He is also requesting STD testing.

## 2024-05-01 NOTE — PHYSICAL EXAM
[No Acute Distress] : no acute distress [Well Developed] : well developed [Well-Appearing] : well-appearing [Normal Sclera/Conjunctiva] : normal sclera/conjunctiva [EOMI] : extraocular movements intact [Normal TMs] : both tympanic membranes were normal [No Lymphadenopathy] : no lymphadenopathy [Supple] : supple [No Respiratory Distress] : no respiratory distress  [No Accessory Muscle Use] : no accessory muscle use [Clear to Auscultation] : lungs were clear to auscultation bilaterally [Normal Rate] : normal rate  [Regular Rhythm] : with a regular rhythm [Normal S1, S2] : normal S1 and S2 [No Edema] : there was no peripheral edema [Grossly Normal Strength/Tone] : grossly normal strength/tone [No Rash] : no rash [No Focal Deficits] : no focal deficits [Normal Affect] : the affect was normal [Normal Insight/Judgement] : insight and judgment were intact [de-identified] : mild erythema

## 2024-05-01 NOTE — PLAN
[FreeTextEntry1] :   #Acute URI -RVP + COVID19  -POCT rapid strep- negative -Supportive care -Benzonatate 100 mg TID prn cough  #STD screening -HIV, Hep B, Hep C, CT/GC, syphilis  Labs drawn in office

## 2024-05-02 LAB
C TRACH RRNA SPEC QL NAA+PROBE: NOT DETECTED
HBV SURFACE AG SER QL: NONREACTIVE
HCV AB SER QL: NONREACTIVE
HCV S/CO RATIO: 0.14 S/CO
HIV1+2 AB SPEC QL IA.RAPID: NONREACTIVE
N GONORRHOEA RRNA SPEC QL NAA+PROBE: NOT DETECTED
RAPID RVP RESULT: DETECTED
RV+EV RNA SPEC QL NAA+PROBE: DETECTED
SARS-COV-2 RNA PNL RESP NAA+PROBE: NOT DETECTED
SOURCE AMPLIFICATION: NORMAL

## 2024-05-20 LAB — T PALLIDUM AB SER QL IA: NEGATIVE

## 2024-05-21 ENCOUNTER — APPOINTMENT (OUTPATIENT)
Dept: UROLOGY | Facility: CLINIC | Age: 34
End: 2024-05-21

## 2024-05-28 ENCOUNTER — RX RENEWAL (OUTPATIENT)
Age: 34
End: 2024-05-28

## 2024-05-28 RX ORDER — CHORIOGONADOTROPIN ALFA 10000 UNIT
10000 KIT INTRAMUSCULAR
Qty: 3 | Refills: 4 | Status: ACTIVE | COMMUNITY
Start: 2024-01-08 | End: 1900-01-01

## 2024-05-29 ENCOUNTER — RX RENEWAL (OUTPATIENT)
Age: 34
End: 2024-05-29

## 2024-05-29 RX ORDER — FOLLITROPIN 300 [IU]/.36ML
300 INJECTION, SOLUTION SUBCUTANEOUS
Qty: 0.84 | Refills: 0 | Status: ACTIVE | COMMUNITY
Start: 2024-01-24 | End: 1900-01-01

## 2024-08-01 ENCOUNTER — RX RENEWAL (OUTPATIENT)
Age: 34
End: 2024-08-01

## 2024-08-15 ENCOUNTER — APPOINTMENT (OUTPATIENT)
Dept: HUMAN REPRODUCTION | Facility: CLINIC | Age: 34
End: 2024-08-15
Payer: COMMERCIAL

## 2024-08-15 DIAGNOSIS — E29.1 TESTICULAR HYPOFUNCTION: ICD-10-CM

## 2024-08-15 PROCEDURE — 89322 SEMEN ANAL STRICT CRITERIA: CPT

## 2024-08-20 ENCOUNTER — TRANSCRIPTION ENCOUNTER (OUTPATIENT)
Age: 34
End: 2024-08-20

## 2024-08-20 LAB
ALBUMIN SERPL ELPH-MCNC: 4.6 G/DL
ALP BLD-CCNC: 100 U/L
ALT SERPL-CCNC: 44 U/L
ANION GAP SERPL CALC-SCNC: 16 MMOL/L
AST SERPL-CCNC: 28 U/L
BILIRUB SERPL-MCNC: 0.3 MG/DL
BUN SERPL-MCNC: 20 MG/DL
CALCIUM SERPL-MCNC: 9 MG/DL
CHLORIDE SERPL-SCNC: 96 MMOL/L
CO2 SERPL-SCNC: 26 MMOL/L
CREAT SERPL-MCNC: 1.57 MG/DL
EGFR: 59 ML/MIN/1.73M2
ESTRADIOL SERPL-MCNC: 40 PG/ML
GLUCOSE SERPL-MCNC: 112 MG/DL
HCT VFR BLD CALC: 45.5 %
HGB BLD-MCNC: 15.9 G/DL
LH SERPL-ACNC: <0.3 IU/L
MCHC RBC-ENTMCNC: 30.2 PG
MCHC RBC-ENTMCNC: 34.9 GM/DL
MCV RBC AUTO: 86.3 FL
PLATELET # BLD AUTO: 352 K/UL
POTASSIUM SERPL-SCNC: 4.1 MMOL/L
PROT SERPL-MCNC: 7.3 G/DL
RBC # BLD: 5.27 M/UL
RBC # FLD: 14.2 %
SODIUM SERPL-SCNC: 139 MMOL/L
TESTOST SERPL-MCNC: 410 NG/DL
WBC # FLD AUTO: 6.94 K/UL

## 2024-08-21 ENCOUNTER — NON-APPOINTMENT (OUTPATIENT)
Age: 34
End: 2024-08-21

## 2024-08-21 LAB — FSH SERPL-MCNC: 3.2 IU/L

## 2024-08-22 ENCOUNTER — APPOINTMENT (OUTPATIENT)
Dept: UROLOGY | Facility: CLINIC | Age: 34
End: 2024-08-22
Payer: COMMERCIAL

## 2024-08-22 VITALS
HEART RATE: 91 BPM | HEIGHT: 69 IN | SYSTOLIC BLOOD PRESSURE: 121 MMHG | WEIGHT: 150 LBS | DIASTOLIC BLOOD PRESSURE: 77 MMHG | BODY MASS INDEX: 22.22 KG/M2 | TEMPERATURE: 97.8 F

## 2024-08-22 PROCEDURE — G2211 COMPLEX E/M VISIT ADD ON: CPT

## 2024-08-22 PROCEDURE — 99214 OFFICE O/P EST MOD 30 MIN: CPT

## 2024-08-23 ENCOUNTER — TRANSCRIPTION ENCOUNTER (OUTPATIENT)
Age: 34
End: 2024-08-23

## 2024-08-23 ENCOUNTER — NON-APPOINTMENT (OUTPATIENT)
Age: 34
End: 2024-08-23

## 2024-09-19 ENCOUNTER — RX RENEWAL (OUTPATIENT)
Age: 34
End: 2024-09-19

## 2024-10-09 ENCOUNTER — NON-APPOINTMENT (OUTPATIENT)
Age: 34
End: 2024-10-09

## 2024-11-04 RX ORDER — CHORIOGONADOTROPIN ALFA 250 UG/.5ML
250 INJECTION, SOLUTION SUBCUTANEOUS
Qty: 6 | Refills: 0 | Status: ACTIVE | COMMUNITY
Start: 2024-11-04 | End: 1900-01-01

## 2024-11-05 ENCOUNTER — NON-APPOINTMENT (OUTPATIENT)
Age: 34
End: 2024-11-05

## 2024-11-06 ENCOUNTER — RX RENEWAL (OUTPATIENT)
Age: 34
End: 2024-11-06

## 2024-11-06 ENCOUNTER — NON-APPOINTMENT (OUTPATIENT)
Age: 34
End: 2024-11-06

## 2024-12-30 ENCOUNTER — RX RENEWAL (OUTPATIENT)
Age: 34
End: 2024-12-30

## 2025-01-13 ENCOUNTER — RX RENEWAL (OUTPATIENT)
Age: 35
End: 2025-01-13

## 2025-02-05 ENCOUNTER — TRANSCRIPTION ENCOUNTER (OUTPATIENT)
Age: 35
End: 2025-02-05

## 2025-02-08 ENCOUNTER — EMERGENCY (EMERGENCY)
Facility: HOSPITAL | Age: 35
LOS: 1 days | Discharge: HOME | End: 2025-02-08
Attending: EMERGENCY MEDICINE | Admitting: EMERGENCY MEDICINE
Payer: COMMERCIAL

## 2025-02-08 VITALS
SYSTOLIC BLOOD PRESSURE: 119 MMHG | HEART RATE: 82 BPM | DIASTOLIC BLOOD PRESSURE: 81 MMHG | RESPIRATION RATE: 16 BRPM | TEMPERATURE: 98 F | OXYGEN SATURATION: 99 %

## 2025-02-08 VITALS
SYSTOLIC BLOOD PRESSURE: 142 MMHG | HEART RATE: 89 BPM | WEIGHT: 149.91 LBS | OXYGEN SATURATION: 98 % | DIASTOLIC BLOOD PRESSURE: 93 MMHG | TEMPERATURE: 98 F | RESPIRATION RATE: 18 BRPM

## 2025-02-08 LAB
ALBUMIN SERPL ELPH-MCNC: 4 G/DL — SIGNIFICANT CHANGE UP (ref 3.4–5)
ALP SERPL-CCNC: 101 U/L — SIGNIFICANT CHANGE UP (ref 40–120)
ALT FLD-CCNC: 55 U/L — HIGH (ref 12–42)
ANION GAP SERPL CALC-SCNC: 7 MMOL/L — LOW (ref 9–16)
AST SERPL-CCNC: 27 U/L — SIGNIFICANT CHANGE UP (ref 15–37)
BASOPHILS # BLD AUTO: 0.03 K/UL — SIGNIFICANT CHANGE UP (ref 0–0.2)
BASOPHILS NFR BLD AUTO: 0.4 % — SIGNIFICANT CHANGE UP (ref 0–2)
BILIRUB SERPL-MCNC: 0.3 MG/DL — SIGNIFICANT CHANGE UP (ref 0.2–1.2)
BUN SERPL-MCNC: 23 MG/DL — SIGNIFICANT CHANGE UP (ref 7–23)
CALCIUM SERPL-MCNC: 8.8 MG/DL — SIGNIFICANT CHANGE UP (ref 8.5–10.5)
CHLORIDE SERPL-SCNC: 99 MMOL/L — SIGNIFICANT CHANGE UP (ref 96–108)
CO2 SERPL-SCNC: 30 MMOL/L — SIGNIFICANT CHANGE UP (ref 22–31)
CREAT SERPL-MCNC: 1.53 MG/DL — HIGH (ref 0.5–1.3)
EGFR: 61 ML/MIN/1.73M2 — SIGNIFICANT CHANGE UP
EOSINOPHIL # BLD AUTO: 0.15 K/UL — SIGNIFICANT CHANGE UP (ref 0–0.5)
EOSINOPHIL NFR BLD AUTO: 2 % — SIGNIFICANT CHANGE UP (ref 0–6)
GLUCOSE SERPL-MCNC: 97 MG/DL — SIGNIFICANT CHANGE UP (ref 70–99)
HCT VFR BLD CALC: 43.9 % — SIGNIFICANT CHANGE UP (ref 39–50)
HGB BLD-MCNC: 15.4 G/DL — SIGNIFICANT CHANGE UP (ref 13–17)
IMM GRANULOCYTES NFR BLD AUTO: 0.7 % — SIGNIFICANT CHANGE UP (ref 0–0.9)
LYMPHOCYTES # BLD AUTO: 1.79 K/UL — SIGNIFICANT CHANGE UP (ref 1–3.3)
LYMPHOCYTES # BLD AUTO: 23.6 % — SIGNIFICANT CHANGE UP (ref 13–44)
MCHC RBC-ENTMCNC: 29.3 PG — SIGNIFICANT CHANGE UP (ref 27–34)
MCHC RBC-ENTMCNC: 35.1 G/DL — SIGNIFICANT CHANGE UP (ref 32–36)
MCV RBC AUTO: 83.6 FL — SIGNIFICANT CHANGE UP (ref 80–100)
MONOCYTES # BLD AUTO: 0.65 K/UL — SIGNIFICANT CHANGE UP (ref 0–0.9)
MONOCYTES NFR BLD AUTO: 8.6 % — SIGNIFICANT CHANGE UP (ref 2–14)
NEUTROPHILS # BLD AUTO: 4.91 K/UL — SIGNIFICANT CHANGE UP (ref 1.8–7.4)
NEUTROPHILS NFR BLD AUTO: 64.7 % — SIGNIFICANT CHANGE UP (ref 43–77)
NRBC # BLD: 0 /100 WBCS — SIGNIFICANT CHANGE UP (ref 0–0)
NRBC BLD-RTO: 0 /100 WBCS — SIGNIFICANT CHANGE UP (ref 0–0)
PLATELET # BLD AUTO: 380 K/UL — SIGNIFICANT CHANGE UP (ref 150–400)
POTASSIUM SERPL-MCNC: 3.9 MMOL/L — SIGNIFICANT CHANGE UP (ref 3.5–5.3)
POTASSIUM SERPL-SCNC: 3.9 MMOL/L — SIGNIFICANT CHANGE UP (ref 3.5–5.3)
PROT SERPL-MCNC: 7.8 G/DL — SIGNIFICANT CHANGE UP (ref 6.4–8.2)
RBC # BLD: 5.25 M/UL — SIGNIFICANT CHANGE UP (ref 4.2–5.8)
RBC # FLD: 12.6 % — SIGNIFICANT CHANGE UP (ref 10.3–14.5)
SODIUM SERPL-SCNC: 136 MMOL/L — SIGNIFICANT CHANGE UP (ref 132–145)
WBC # BLD: 7.58 K/UL — SIGNIFICANT CHANGE UP (ref 3.8–10.5)
WBC # FLD AUTO: 7.58 K/UL — SIGNIFICANT CHANGE UP (ref 3.8–10.5)

## 2025-02-08 PROCEDURE — 70496 CT ANGIOGRAPHY HEAD: CPT | Mod: 26

## 2025-02-08 PROCEDURE — 99285 EMERGENCY DEPT VISIT HI MDM: CPT

## 2025-02-08 PROCEDURE — 70450 CT HEAD/BRAIN W/O DYE: CPT | Mod: 26,59

## 2025-02-08 PROCEDURE — 70498 CT ANGIOGRAPHY NECK: CPT | Mod: 26

## 2025-02-08 RX ORDER — BACTERIOSTATIC SODIUM CHLORIDE 0.9 %
1000 VIAL (ML) INJECTION ONCE
Refills: 0 | Status: COMPLETED | OUTPATIENT
Start: 2025-02-08 | End: 2025-02-08

## 2025-02-08 RX ADMIN — Medication 1000 MILLILITER(S): at 10:29

## 2025-02-08 NOTE — ED PROVIDER NOTE - NSFOLLOWUPINSTRUCTIONS_ED_ALL_ED_FT
1. stay well hydrated  2. take all prior medications as directed without fail  3. follow up with your neuro-oncologist and retina specialist listed above next week without fail   4. return to ER if your symptoms worsen or for any other concern    Dr Rocha [facility]   Address: [address] [place] [address]  Phone: [phone]

## 2025-02-08 NOTE — ED PROVIDER NOTE - CLINICAL SUMMARY MEDICAL DECISION MAKING FREE TEXT BOX
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  The patient has a history of a medulloblastoma previously treated with chemo radiation and surgery.  Patient presents today as he experienced loss of peripheral vision in his left eye only for approximately 20 minutes earlier today.  This symptom has completely resolved and the patient states that his vision is now normal.    case with w/ neuro stroke halle chung.   plan for labs and ct head cta head and neck    results of testing dw the patient and dr neri plan for f/u with neurology and ophtho     ED evaluation and management discussed with the patient and family (if available) in detail.  Close PMD follow up encouraged.  Strict ED return instructions discussed in detail and patient given the opportunity to ask any questions about their discharge diagnosis and instructions. Patient verbalized understanding.

## 2025-02-08 NOTE — ED PROVIDER NOTE - PATIENT PORTAL LINK FT
You can access the FollowMyHealth Patient Portal offered by [facility] by registering at the following website: [email or web address]. By joining [employer]’s FollowMyHealth portal, you will also be able to view your health information using other applications (apps) compatible with our system.

## 2025-02-08 NOTE — ED PROVIDER NOTE - NEUROLOGICAL, MLM
Alert and oriented, no focal deficits, no motor or sensory deficits. cn 3 - 11 normal speech and gait normal VA 20 /20 bilaterally visual field testing symmetrical and normal

## 2025-02-08 NOTE — ED ADULT TRIAGE NOTE - CHIEF COMPLAINT QUOTE
pt with a hx of brain tumor on radiation c/o loss of peripheral vision which has now resolved and a headache, denies any other symptoms. (befast-)

## 2025-02-08 NOTE — ED PROVIDER NOTE - OBJECTIVE STATEMENT
pt with a hx of brain tumor on radiation c/o loss of peripheral vision which has now resolved and a headache, denies any other symptoms. (befast-) pt with a hx of brain tumor on radiation c/o loss of peripheral vision which has now resolved and a headache, denies any other symptoms. 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  The patient has a history of a medulloblastoma previously treated with chemo radiation and surgery.  Patient presents today as he experienced loss of peripheral vision in his left eye only for approximately 20 minutes earlier today.  This symptom has completely resolved and the patient states that his vision is now normal.    During episode of the peripheral vision loss the patient denied any motor or sensory speech balance changes or difficulty with speaking or understanding speech or swallowing.    The patient denies the following symptoms:  headache, dizziness/lightheadedness, neck pain/neck stiffness, numbness/tingling, photophobia, , focal weakness, rash, fever, chills, chest/neck/jaw/arm or back pain, palpitations, shortness of breath, diaphoresis, swelling to arm and/or legs, N/V/D, abdominal pain, abdominal distention, back pain, flank pain

## 2025-02-08 NOTE — ED PROVIDER NOTE - IV ALTEPLASE EXCL ABS HIDDEN
show 1. I was told the name of the doctor(s) who took care of my child while in the hospital.    2. I have been told about any important findings on my child's plan of care.    3. The doctor clearly explained my child's diagnosis and other possible diagnoses that were considered.    4. My child's doctor explained all the tests that were done and their results (if available). I understand that some of the test results may not be ready before we go home and I was told how I can get these results. I understand that a summary of my child's hospitalization and important test results will be shared with my child's outpatient doctor.    5. My child's doctor talked to me about what I need to do when we go home.    6. I understand what signs and symptoms to watch for. I understand what symptoms I would need to call my doctor for and/or return to the hospital.    7. I have the phone number to call the hospital for results and/or questions after I leave the hospital.

## 2025-02-10 ENCOUNTER — RX RENEWAL (OUTPATIENT)
Age: 35
End: 2025-02-10

## 2025-02-11 DIAGNOSIS — H53.8 OTHER VISUAL DISTURBANCES: ICD-10-CM

## 2025-03-05 ENCOUNTER — TRANSCRIPTION ENCOUNTER (OUTPATIENT)
Age: 35
End: 2025-03-05

## 2025-03-05 DIAGNOSIS — E23.0 HYPOPITUITARISM: ICD-10-CM

## 2025-03-05 DIAGNOSIS — E29.1 TESTICULAR HYPOFUNCTION: ICD-10-CM

## 2025-03-10 ENCOUNTER — RX RENEWAL (OUTPATIENT)
Age: 35
End: 2025-03-10

## 2025-03-17 ENCOUNTER — RX RENEWAL (OUTPATIENT)
Age: 35
End: 2025-03-17

## 2025-03-20 ENCOUNTER — TRANSCRIPTION ENCOUNTER (OUTPATIENT)
Age: 35
End: 2025-03-20

## 2025-03-21 ENCOUNTER — NON-APPOINTMENT (OUTPATIENT)
Age: 35
End: 2025-03-21

## 2025-03-21 DIAGNOSIS — E23.0 HYPOPITUITARISM: ICD-10-CM

## 2025-03-24 ENCOUNTER — APPOINTMENT (OUTPATIENT)
Age: 35
End: 2025-03-24
Payer: COMMERCIAL

## 2025-03-24 ENCOUNTER — APPOINTMENT (OUTPATIENT)
Dept: NEUROLOGY | Facility: CLINIC | Age: 35
End: 2025-03-24
Payer: COMMERCIAL

## 2025-03-24 ENCOUNTER — TRANSCRIPTION ENCOUNTER (OUTPATIENT)
Age: 35
End: 2025-03-24

## 2025-03-24 ENCOUNTER — NON-APPOINTMENT (OUTPATIENT)
Age: 35
End: 2025-03-24

## 2025-03-24 VITALS
DIASTOLIC BLOOD PRESSURE: 74 MMHG | SYSTOLIC BLOOD PRESSURE: 120 MMHG | WEIGHT: 158 LBS | HEIGHT: 69 IN | OXYGEN SATURATION: 99 % | HEART RATE: 92 BPM | BODY MASS INDEX: 23.4 KG/M2 | TEMPERATURE: 97.6 F | RESPIRATION RATE: 14 BRPM

## 2025-03-24 DIAGNOSIS — Z23 ENCOUNTER FOR IMMUNIZATION: ICD-10-CM

## 2025-03-24 DIAGNOSIS — D32.9 BENIGN NEOPLASM OF MENINGES, UNSPECIFIED: ICD-10-CM

## 2025-03-24 DIAGNOSIS — R79.89 OTHER SPECIFIED ABNORMAL FINDINGS OF BLOOD CHEMISTRY: ICD-10-CM

## 2025-03-24 DIAGNOSIS — R50.9 FEVER, UNSPECIFIED: ICD-10-CM

## 2025-03-24 DIAGNOSIS — Z00.00 ENCOUNTER FOR GENERAL ADULT MEDICAL EXAMINATION W/OUT ABNORMAL FINDINGS: ICD-10-CM

## 2025-03-24 DIAGNOSIS — E23.0 HYPOPITUITARISM: ICD-10-CM

## 2025-03-24 DIAGNOSIS — Z87.898 PERSONAL HISTORY OF OTHER SPECIFIED CONDITIONS: ICD-10-CM

## 2025-03-24 DIAGNOSIS — H53.453 OTHER LOCALIZED VISUAL FIELD DEFECT, BILATERAL: ICD-10-CM

## 2025-03-24 PROCEDURE — 36415 COLL VENOUS BLD VENIPUNCTURE: CPT

## 2025-03-24 PROCEDURE — 90471 IMMUNIZATION ADMIN: CPT

## 2025-03-24 PROCEDURE — 99213 OFFICE O/P EST LOW 20 MIN: CPT | Mod: 95

## 2025-03-24 PROCEDURE — 99395 PREV VISIT EST AGE 18-39: CPT | Mod: 25

## 2025-03-24 PROCEDURE — 90715 TDAP VACCINE 7 YRS/> IM: CPT

## 2025-03-25 ENCOUNTER — TRANSCRIPTION ENCOUNTER (OUTPATIENT)
Age: 35
End: 2025-03-25

## 2025-03-26 ENCOUNTER — TRANSCRIPTION ENCOUNTER (OUTPATIENT)
Age: 35
End: 2025-03-26

## 2025-03-26 LAB
ALBUMIN SERPL ELPH-MCNC: 4.7 G/DL
ALP BLD-CCNC: 115 U/L
ALT SERPL-CCNC: 57 U/L
ANION GAP SERPL CALC-SCNC: 17 MMOL/L
APPEARANCE: CLEAR
AST SERPL-CCNC: 28 U/L
BACTERIA: NEGATIVE /HPF
BASOPHILS # BLD AUTO: 0.04 K/UL
BASOPHILS NFR BLD AUTO: 0.6 %
BILIRUB SERPL-MCNC: 0.2 MG/DL
BILIRUBIN URINE: NEGATIVE
BLOOD URINE: NEGATIVE
BUN SERPL-MCNC: 21 MG/DL
C TRACH RRNA SPEC QL NAA+PROBE: NOT DETECTED
CALCIUM SERPL-MCNC: 8.9 MG/DL
CAST: 1 /LPF
CHLORIDE SERPL-SCNC: 99 MMOL/L
CHOLEST SERPL-MCNC: 249 MG/DL
CO2 SERPL-SCNC: 24 MMOL/L
COLOR: YELLOW
CREAT SERPL-MCNC: 1.42 MG/DL
EGFRCR SERPLBLD CKD-EPI 2021: 66 ML/MIN/1.73M2
EOSINOPHIL # BLD AUTO: 0.14 K/UL
EOSINOPHIL NFR BLD AUTO: 2.1 %
EPITHELIAL CELLS: 0 /HPF
ESTIMATED AVERAGE GLUCOSE: 114 MG/DL
FSH SERPL-MCNC: 0.4 IU/L
GLUCOSE QUALITATIVE U: NEGATIVE MG/DL
GLUCOSE SERPL-MCNC: 81 MG/DL
HBA1C MFR BLD HPLC: 5.6 %
HBV SURFACE AG SER QL: NONREACTIVE
HCT VFR BLD CALC: 45.6 %
HCV AB SER QL: NONREACTIVE
HCV S/CO RATIO: 0.13 S/CO
HDLC SERPL-MCNC: 33 MG/DL
HGB BLD-MCNC: 15.9 G/DL
HIV1+2 AB SPEC QL IA.RAPID: NONREACTIVE
IMM GRANULOCYTES NFR BLD AUTO: 0.4 %
KETONES URINE: NEGATIVE MG/DL
LDLC SERPL-MCNC: 173 MG/DL
LEUKOCYTE ESTERASE URINE: NEGATIVE
LH SERPL-ACNC: <0.3 IU/L
LYMPHOCYTES # BLD AUTO: 1.64 K/UL
LYMPHOCYTES NFR BLD AUTO: 24.2 %
MAN DIFF?: NORMAL
MCHC RBC-ENTMCNC: 29.4 PG
MCHC RBC-ENTMCNC: 34.9 G/DL
MCV RBC AUTO: 84.3 FL
MICROSCOPIC-UA: NORMAL
MONOCYTES # BLD AUTO: 0.75 K/UL
MONOCYTES NFR BLD AUTO: 11.1 %
N GONORRHOEA RRNA SPEC QL NAA+PROBE: NOT DETECTED
NEUTROPHILS # BLD AUTO: 4.18 K/UL
NEUTROPHILS NFR BLD AUTO: 61.6 %
NITRITE URINE: NEGATIVE
NONHDLC SERPL-MCNC: 216 MG/DL
PH URINE: 5.5
PLATELET # BLD AUTO: 345 K/UL
POTASSIUM SERPL-SCNC: 3.8 MMOL/L
PROLACTIN SERPL-MCNC: 12.8 NG/ML
PROT SERPL-MCNC: 7.5 G/DL
PROTEIN URINE: NORMAL MG/DL
RBC # BLD: 5.41 M/UL
RBC # FLD: 13.8 %
RED BLOOD CELLS URINE: 0 /HPF
SODIUM SERPL-SCNC: 140 MMOL/L
SOURCE AMPLIFICATION: NORMAL
SPECIFIC GRAVITY URINE: 1.02
TESTOST SERPL-MCNC: 534 NG/DL
TRIGL SERPL-MCNC: 223 MG/DL
TSH SERPL-ACNC: 0.86 UIU/ML
UROBILINOGEN URINE: 0.2 MG/DL
WBC # FLD AUTO: 6.78 K/UL
WHITE BLOOD CELLS URINE: 0 /HPF

## 2025-04-01 LAB — T PALLIDUM AB SER QL IA: NEGATIVE

## 2025-04-14 ENCOUNTER — APPOINTMENT (OUTPATIENT)
Dept: ENDOCRINOLOGY | Facility: CLINIC | Age: 35
End: 2025-04-14

## 2025-04-14 VITALS
DIASTOLIC BLOOD PRESSURE: 71 MMHG | HEART RATE: 86 BPM | WEIGHT: 158 LBS | SYSTOLIC BLOOD PRESSURE: 125 MMHG | BODY MASS INDEX: 23.33 KG/M2

## 2025-04-14 DIAGNOSIS — E23.0 HYPOPITUITARISM: ICD-10-CM

## 2025-04-14 DIAGNOSIS — E29.1 TESTICULAR HYPOFUNCTION: ICD-10-CM

## 2025-04-14 PROCEDURE — 99215 OFFICE O/P EST HI 40 MIN: CPT

## 2025-04-17 ENCOUNTER — TRANSCRIPTION ENCOUNTER (OUTPATIENT)
Age: 35
End: 2025-04-17

## 2025-04-21 ENCOUNTER — APPOINTMENT (OUTPATIENT)
Dept: NEPHROLOGY | Facility: CLINIC | Age: 35
End: 2025-04-21

## 2025-04-21 ENCOUNTER — TRANSCRIPTION ENCOUNTER (OUTPATIENT)
Age: 35
End: 2025-04-21

## 2025-07-07 ENCOUNTER — RX RENEWAL (OUTPATIENT)
Age: 35
End: 2025-07-07

## 2025-07-07 RX ORDER — NEEDLES, DISPOSABLE 25GX5/8"
27G X 1/2" NEEDLE, DISPOSABLE MISCELLANEOUS
Qty: 10 | Refills: 2 | Status: ACTIVE | COMMUNITY
Start: 2025-07-07 | End: 1900-01-01

## 2025-07-07 RX ORDER — SYRINGE WITH NEEDLE, 1 ML 25GX5/8"
23G X 1" SYRINGE, EMPTY DISPOSABLE MISCELLANEOUS
Qty: 10 | Refills: 2 | Status: ACTIVE | COMMUNITY
Start: 2025-07-07 | End: 1900-01-01

## 2025-08-18 ENCOUNTER — APPOINTMENT (OUTPATIENT)
Dept: ENDOCRINOLOGY | Facility: CLINIC | Age: 35
End: 2025-08-18

## 2025-09-10 ENCOUNTER — RX RENEWAL (OUTPATIENT)
Age: 35
End: 2025-09-10

## 2025-09-16 ENCOUNTER — NON-APPOINTMENT (OUTPATIENT)
Age: 35
End: 2025-09-16